# Patient Record
Sex: FEMALE | Race: WHITE
[De-identification: names, ages, dates, MRNs, and addresses within clinical notes are randomized per-mention and may not be internally consistent; named-entity substitution may affect disease eponyms.]

---

## 2019-03-27 ENCOUNTER — HOSPITAL ENCOUNTER (EMERGENCY)
Dept: HOSPITAL 12 - ER | Age: 64
Discharge: HOME | End: 2019-03-27
Payer: MEDICARE

## 2019-03-27 VITALS — WEIGHT: 205 LBS | BODY MASS INDEX: 37.73 KG/M2 | HEIGHT: 62 IN

## 2019-03-27 DIAGNOSIS — Z79.899: ICD-10-CM

## 2019-03-27 DIAGNOSIS — Z79.82: ICD-10-CM

## 2019-03-27 DIAGNOSIS — E78.5: ICD-10-CM

## 2019-03-27 DIAGNOSIS — G47.00: Primary | ICD-10-CM

## 2019-03-27 DIAGNOSIS — E03.9: ICD-10-CM

## 2019-03-27 DIAGNOSIS — K21.9: ICD-10-CM

## 2019-03-27 LAB
ALP SERPL-CCNC: 79 U/L (ref 50–136)
ALT SERPL W/O P-5'-P-CCNC: 23 U/L (ref 14–59)
AMPHETAMINES UR QL SCN>1000 NG/ML: NEGATIVE
APAP SERPL-MCNC: < 2 UG/ML (ref 10–30)
APPEARANCE UR: CLEAR
AST SERPL-CCNC: 14 U/L (ref 15–37)
BARBITURATES UR QL SCN: NEGATIVE
BASOPHILS # BLD AUTO: 0 K/UL (ref 0–8)
BASOPHILS NFR BLD AUTO: 0.4 % (ref 0–2)
BILIRUB DIRECT SERPL-MCNC: 0.1 MG/DL (ref 0–0.2)
BILIRUB SERPL-MCNC: 0.2 MG/DL (ref 0.2–1)
BILIRUB UR QL STRIP: NEGATIVE
BUN SERPL-MCNC: 35 MG/DL (ref 7–18)
CHLORIDE SERPL-SCNC: 100 MMOL/L (ref 98–107)
CO2 SERPL-SCNC: 28 MMOL/L (ref 21–32)
COCAINE UR QL SCN: NEGATIVE
COLOR UR: YELLOW
CREAT SERPL-MCNC: 0.8 MG/DL (ref 0.6–1.3)
DEPRECATED SQUAMOUS URNS QL MICRO: (no result) /HPF
EOSINOPHIL # BLD AUTO: 0 K/UL (ref 0–0.7)
EOSINOPHIL NFR BLD AUTO: 0 % (ref 0–7)
ETHANOL SERPL-MCNC: < 3 MG/DL (ref 0–0)
GLUCOSE SERPL-MCNC: 159 MG/DL (ref 74–106)
GLUCOSE UR STRIP-MCNC: NEGATIVE MG/DL
HCT VFR BLD AUTO: 36.8 % (ref 31.2–41.9)
HGB BLD-MCNC: 12.3 G/DL (ref 10.9–14.3)
HGB UR QL STRIP: (no result)
KETONES UR STRIP-MCNC: NEGATIVE MG/DL
LEUKOCYTE ESTERASE UR QL STRIP: (no result)
LYMPHOCYTES # BLD AUTO: 1.1 K/UL (ref 20–40)
LYMPHOCYTES NFR BLD AUTO: 16.2 % (ref 20.5–51.5)
MCH RBC QN AUTO: 29.2 UUG (ref 24.7–32.8)
MCHC RBC AUTO-ENTMCNC: 34 G/DL (ref 32.3–35.6)
MCV RBC AUTO: 87.3 FL (ref 75.5–95.3)
MONOCYTES # BLD AUTO: 0.1 K/UL (ref 2–10)
MONOCYTES NFR BLD AUTO: 2 % (ref 0–11)
NEUTROPHILS # BLD AUTO: 5.4 K/UL (ref 1.8–8.9)
NEUTROPHILS NFR BLD AUTO: 81.4 % (ref 38.5–71.5)
NITRITE UR QL STRIP: NEGATIVE
OPIATES UR QL SCN: POSITIVE
PCP UR QL SCN>25 NG/ML: NEGATIVE
PH UR STRIP: 6 [PH] (ref 5–8)
PLATELET # BLD AUTO: 190 K/UL (ref 179–408)
POTASSIUM SERPL-SCNC: 4.8 MMOL/L (ref 3.5–5.1)
RBC # BLD AUTO: 4.21 MIL/UL (ref 3.63–4.92)
RBC #/AREA URNS HPF: (no result) /HPF (ref 0–3)
SP GR UR STRIP: 1.02 (ref 1–1.03)
THC UR QL SCN>50 NG/ML: NEGATIVE
UROBILINOGEN UR STRIP-MCNC: 0.2 E.U./DL
WBC # BLD AUTO: 6.7 K/UL (ref 3.8–11.8)
WBC #/AREA URNS HPF: (no result) /HPF
WBC #/AREA URNS HPF: (no result) /HPF (ref 0–3)
WS STN SPEC: 7.5 G/DL (ref 6.4–8.2)

## 2019-03-27 PROCEDURE — 99284 EMERGENCY DEPT VISIT MOD MDM: CPT

## 2019-03-27 PROCEDURE — 36415 COLL VENOUS BLD VENIPUNCTURE: CPT

## 2019-03-27 PROCEDURE — 80048 BASIC METABOLIC PNL TOTAL CA: CPT

## 2019-03-27 PROCEDURE — A4663 DIALYSIS BLOOD PRESSURE CUFF: HCPCS

## 2019-03-27 PROCEDURE — 80076 HEPATIC FUNCTION PANEL: CPT

## 2019-03-27 PROCEDURE — 93005 ELECTROCARDIOGRAM TRACING: CPT

## 2019-03-27 PROCEDURE — 81001 URINALYSIS AUTO W/SCOPE: CPT

## 2019-03-27 PROCEDURE — G0480 DRUG TEST DEF 1-7 CLASSES: HCPCS

## 2019-03-27 PROCEDURE — 85025 COMPLETE CBC W/AUTO DIFF WBC: CPT

## 2019-03-27 PROCEDURE — 80307 DRUG TEST PRSMV CHEM ANLYZR: CPT

## 2019-03-27 NOTE — NUR
Patient discharged to home in stable conditon & steady gait.  Written and 
verbal after care instructions given to patient. Patient verbalizes 
understanding and compliance of instructions.

## 2020-01-24 ENCOUNTER — HOSPITAL ENCOUNTER (INPATIENT)
Dept: HOSPITAL 12 - ER | Age: 65
LOS: 3 days | Discharge: HOME | DRG: 305 | End: 2020-01-27
Payer: MEDICARE

## 2020-01-24 VITALS — HEIGHT: 62 IN | WEIGHT: 205 LBS | BODY MASS INDEX: 37.73 KG/M2

## 2020-01-24 VITALS — SYSTOLIC BLOOD PRESSURE: 164 MMHG | DIASTOLIC BLOOD PRESSURE: 73 MMHG

## 2020-01-24 DIAGNOSIS — N39.0: ICD-10-CM

## 2020-01-24 DIAGNOSIS — Z87.891: ICD-10-CM

## 2020-01-24 DIAGNOSIS — K43.9: ICD-10-CM

## 2020-01-24 DIAGNOSIS — I25.10: ICD-10-CM

## 2020-01-24 DIAGNOSIS — M25.562: ICD-10-CM

## 2020-01-24 DIAGNOSIS — E78.5: ICD-10-CM

## 2020-01-24 DIAGNOSIS — I10: ICD-10-CM

## 2020-01-24 DIAGNOSIS — E03.9: ICD-10-CM

## 2020-01-24 DIAGNOSIS — I16.9: Primary | ICD-10-CM

## 2020-01-24 DIAGNOSIS — G89.29: ICD-10-CM

## 2020-01-24 DIAGNOSIS — E66.9: ICD-10-CM

## 2020-01-24 DIAGNOSIS — M25.561: ICD-10-CM

## 2020-01-24 DIAGNOSIS — R60.0: ICD-10-CM

## 2020-01-24 DIAGNOSIS — Z90.49: ICD-10-CM

## 2020-01-24 DIAGNOSIS — Z80.41: ICD-10-CM

## 2020-01-24 DIAGNOSIS — Z98.84: ICD-10-CM

## 2020-01-24 DIAGNOSIS — M54.5: ICD-10-CM

## 2020-01-24 LAB
ALP SERPL-CCNC: 57 U/L (ref 50–136)
ALT SERPL W/O P-5'-P-CCNC: 28 U/L (ref 14–59)
AST SERPL-CCNC: 19 U/L (ref 15–37)
BASOPHILS # BLD AUTO: 0 K/UL (ref 0–8)
BASOPHILS NFR BLD AUTO: 0.6 % (ref 0–2)
BILIRUB DIRECT SERPL-MCNC: 0.1 MG/DL (ref 0–0.2)
BILIRUB SERPL-MCNC: 0.4 MG/DL (ref 0.2–1)
BUN SERPL-MCNC: 24 MG/DL (ref 7–18)
CHLORIDE SERPL-SCNC: 105 MMOL/L (ref 98–107)
CO2 SERPL-SCNC: 32 MMOL/L (ref 21–32)
CREAT SERPL-MCNC: 0.8 MG/DL (ref 0.6–1.3)
EOSINOPHIL # BLD AUTO: 0 K/UL (ref 0–0.7)
EOSINOPHIL NFR BLD AUTO: 0.2 % (ref 0–7)
GLUCOSE SERPL-MCNC: 94 MG/DL (ref 74–106)
HCT VFR BLD AUTO: 35.4 % (ref 31.2–41.9)
HGB BLD-MCNC: 11.8 G/DL (ref 10.9–14.3)
LYMPHOCYTES # BLD AUTO: 1.7 K/UL (ref 20–40)
LYMPHOCYTES NFR BLD AUTO: 21.9 % (ref 20.5–51.5)
MCH RBC QN AUTO: 29.8 UUG (ref 24.7–32.8)
MCHC RBC AUTO-ENTMCNC: 33 G/DL (ref 32.3–35.6)
MCV RBC AUTO: 89.2 FL (ref 75.5–95.3)
MONOCYTES # BLD AUTO: 0.4 K/UL (ref 2–10)
MONOCYTES NFR BLD AUTO: 4.8 % (ref 0–11)
NEUTROPHILS # BLD AUTO: 5.5 K/UL (ref 1.8–8.9)
NEUTROPHILS NFR BLD AUTO: 72.5 % (ref 38.5–71.5)
PLATELET # BLD AUTO: 192 K/UL (ref 179–408)
POTASSIUM SERPL-SCNC: 4.4 MMOL/L (ref 3.5–5.1)
RBC # BLD AUTO: 3.97 MIL/UL (ref 3.63–4.92)
WBC # BLD AUTO: 7.5 K/UL (ref 3.8–11.8)
WS STN SPEC: 7.2 G/DL (ref 6.4–8.2)

## 2020-01-24 PROCEDURE — G0378 HOSPITAL OBSERVATION PER HR: HCPCS

## 2020-01-24 PROCEDURE — A4663 DIALYSIS BLOOD PRESSURE CUFF: HCPCS

## 2020-01-24 RX ADMIN — ENOXAPARIN SODIUM SCH MG: 40 INJECTION SUBCUTANEOUS at 21:06

## 2020-01-24 RX ADMIN — ATORVASTATIN CALCIUM SCH MG: 10 TABLET, FILM COATED ORAL at 21:05

## 2020-01-24 NOTE — NUR
PATIENT BROUGHT FROM ED VIA GURNEY , ADMITTED TO TELEMETRY WITH DIAGNOSIS CHEST PAIN. 
PATIENT ALERT AND ORIENTED X 4. PATIENT SATED SHE STILL FEELS HEAVINESS IN THE CHEST AND 
MILD SOB. PLACED ON O2 @ 1LPM WITH O2 SAT 98%. B/P NOTED 164/73. HR 64. NSR ON THE MONITOR. 
WILL CONTINUE TO MONITOR.

## 2020-01-25 VITALS — DIASTOLIC BLOOD PRESSURE: 51 MMHG | SYSTOLIC BLOOD PRESSURE: 118 MMHG

## 2020-01-25 VITALS — DIASTOLIC BLOOD PRESSURE: 64 MMHG | SYSTOLIC BLOOD PRESSURE: 145 MMHG

## 2020-01-25 VITALS — DIASTOLIC BLOOD PRESSURE: 66 MMHG | SYSTOLIC BLOOD PRESSURE: 143 MMHG

## 2020-01-25 VITALS — DIASTOLIC BLOOD PRESSURE: 47 MMHG | SYSTOLIC BLOOD PRESSURE: 122 MMHG

## 2020-01-25 VITALS — DIASTOLIC BLOOD PRESSURE: 57 MMHG | SYSTOLIC BLOOD PRESSURE: 92 MMHG

## 2020-01-25 LAB
BASOPHILS # BLD AUTO: 0 K/UL (ref 0–8)
BASOPHILS NFR BLD AUTO: 0.5 % (ref 0–2)
BUN SERPL-MCNC: 26 MG/DL (ref 7–18)
CHLORIDE SERPL-SCNC: 104 MMOL/L (ref 98–107)
CHOLEST SERPL-MCNC: 157 MG/DL (ref ?–200)
CO2 SERPL-SCNC: 27 MMOL/L (ref 21–32)
CREAT SERPL-MCNC: 1 MG/DL (ref 0.6–1.3)
EOSINOPHIL # BLD AUTO: 0 K/UL (ref 0–0.7)
EOSINOPHIL NFR BLD AUTO: 0.3 % (ref 0–7)
GLUCOSE SERPL-MCNC: 106 MG/DL (ref 74–106)
HCT VFR BLD AUTO: 32.4 % (ref 31.2–41.9)
HDLC SERPL-MCNC: 85 MG/DL (ref 40–60)
HGB BLD-MCNC: 11.1 G/DL (ref 10.9–14.3)
LYMPHOCYTES # BLD AUTO: 1.6 K/UL (ref 20–40)
LYMPHOCYTES NFR BLD AUTO: 25.9 % (ref 20.5–51.5)
MAGNESIUM SERPL-MCNC: 2 MG/DL (ref 1.8–2.4)
MCH RBC QN AUTO: 30.5 UUG (ref 24.7–32.8)
MCHC RBC AUTO-ENTMCNC: 34 G/DL (ref 32.3–35.6)
MCV RBC AUTO: 88.5 FL (ref 75.5–95.3)
MONOCYTES # BLD AUTO: 0.4 K/UL (ref 2–10)
MONOCYTES NFR BLD AUTO: 6 % (ref 0–11)
NEUTROPHILS # BLD AUTO: 4.3 K/UL (ref 1.8–8.9)
NEUTROPHILS NFR BLD AUTO: 67.3 % (ref 38.5–71.5)
PHOSPHATE SERPL-MCNC: 4.6 MG/DL (ref 2.5–4.9)
PLATELET # BLD AUTO: 157 K/UL (ref 179–408)
POTASSIUM SERPL-SCNC: 4.5 MMOL/L (ref 3.5–5.1)
RBC # BLD AUTO: 3.66 MIL/UL (ref 3.63–4.92)
TRIGL SERPL-MCNC: 88 MG/DL (ref 30–150)
TSH SERPL DL<=0.005 MIU/L-ACNC: 0.23 MIU/ML (ref 0.36–3.74)
WBC # BLD AUTO: 6.3 K/UL (ref 3.8–11.8)

## 2020-01-25 RX ADMIN — ASPIRIN SCH MG: 81 TABLET, COATED ORAL at 08:39

## 2020-01-25 RX ADMIN — Medication PRN MG: at 23:20

## 2020-01-25 RX ADMIN — Medication SCH MG: at 08:42

## 2020-01-25 RX ADMIN — ATORVASTATIN CALCIUM SCH MG: 10 TABLET, FILM COATED ORAL at 21:03

## 2020-01-25 RX ADMIN — ESCITALOPRAM OXALATE SCH MG: 10 TABLET, FILM COATED ORAL at 08:40

## 2020-01-25 RX ADMIN — Medication PRN MG: at 00:42

## 2020-01-25 RX ADMIN — Medication PRN MG: at 08:48

## 2020-01-25 RX ADMIN — LEVOTHYROXINE SODIUM SCH MCG: 112 TABLET ORAL at 06:05

## 2020-01-25 RX ADMIN — Medication SCH MG: at 16:27

## 2020-01-25 RX ADMIN — Medication PRN MG: at 16:38

## 2020-01-25 RX ADMIN — PANTOPRAZOLE SODIUM SCH MG: 40 TABLET, DELAYED RELEASE ORAL at 06:05

## 2020-01-25 RX ADMIN — ENOXAPARIN SODIUM SCH MG: 40 INJECTION SUBCUTANEOUS at 21:04

## 2020-01-25 NOTE — NUR
PATIENT SLEPT FOR 2-3 HRS. PAIN MEDICATION ADMINISTERED ON C/O BILATERAL KNEE PAIN. NO CHEST 
PAIN NOTED. SR ON THE MONITOR.

## 2020-01-26 ENCOUNTER — HOSPITAL ENCOUNTER (OUTPATIENT)
Dept: HOSPITAL 54 - RAD | Age: 65
Discharge: HOME | End: 2020-01-26
Attending: INTERNAL MEDICINE
Payer: MEDICARE

## 2020-01-26 VITALS — SYSTOLIC BLOOD PRESSURE: 139 MMHG | DIASTOLIC BLOOD PRESSURE: 60 MMHG

## 2020-01-26 VITALS — SYSTOLIC BLOOD PRESSURE: 160 MMHG | DIASTOLIC BLOOD PRESSURE: 55 MMHG

## 2020-01-26 VITALS — HEIGHT: 62 IN | BODY MASS INDEX: 38.09 KG/M2 | WEIGHT: 207 LBS

## 2020-01-26 VITALS — DIASTOLIC BLOOD PRESSURE: 54 MMHG | SYSTOLIC BLOOD PRESSURE: 147 MMHG

## 2020-01-26 VITALS — SYSTOLIC BLOOD PRESSURE: 127 MMHG | DIASTOLIC BLOOD PRESSURE: 55 MMHG

## 2020-01-26 VITALS — SYSTOLIC BLOOD PRESSURE: 132 MMHG | DIASTOLIC BLOOD PRESSURE: 64 MMHG

## 2020-01-26 VITALS — DIASTOLIC BLOOD PRESSURE: 51 MMHG | SYSTOLIC BLOOD PRESSURE: 130 MMHG

## 2020-01-26 VITALS — DIASTOLIC BLOOD PRESSURE: 53 MMHG | SYSTOLIC BLOOD PRESSURE: 133 MMHG

## 2020-01-26 DIAGNOSIS — E78.5: ICD-10-CM

## 2020-01-26 DIAGNOSIS — I25.10: ICD-10-CM

## 2020-01-26 DIAGNOSIS — I10: Primary | ICD-10-CM

## 2020-01-26 DIAGNOSIS — E03.9: ICD-10-CM

## 2020-01-26 DIAGNOSIS — I25.83: ICD-10-CM

## 2020-01-26 DIAGNOSIS — R60.0: ICD-10-CM

## 2020-01-26 LAB
AMPHETAMINES UR QL SCN>1000 NG/ML: NEGATIVE
APPEARANCE UR: CLEAR
BARBITURATES UR QL SCN: NEGATIVE
BASOPHILS # BLD AUTO: 0 K/UL (ref 0–8)
BASOPHILS NFR BLD AUTO: 0.5 % (ref 0–2)
BILIRUB UR QL STRIP: NEGATIVE
BUN SERPL-MCNC: 26 MG/DL (ref 7–18)
CHLORIDE SERPL-SCNC: 104 MMOL/L (ref 98–107)
CO2 SERPL-SCNC: 32 MMOL/L (ref 21–32)
COCAINE UR QL SCN: NEGATIVE
COLOR UR: YELLOW
CREAT SERPL-MCNC: 1 MG/DL (ref 0.6–1.3)
DEPRECATED SQUAMOUS URNS QL MICRO: (no result) /HPF
EOSINOPHIL # BLD AUTO: 0 K/UL (ref 0–0.7)
EOSINOPHIL NFR BLD AUTO: 0.7 % (ref 0–7)
GLUCOSE SERPL-MCNC: 123 MG/DL (ref 74–106)
GLUCOSE UR STRIP-MCNC: NEGATIVE MG/DL
HCT VFR BLD AUTO: 33.3 % (ref 31.2–41.9)
HGB BLD-MCNC: 11.4 G/DL (ref 10.9–14.3)
HGB UR QL STRIP: (no result)
KETONES UR STRIP-MCNC: NEGATIVE MG/DL
LEUKOCYTE ESTERASE UR QL STRIP: (no result)
LYMPHOCYTES # BLD AUTO: 1.8 K/UL (ref 20–40)
LYMPHOCYTES NFR BLD AUTO: 24.4 % (ref 20.5–51.5)
MCH RBC QN AUTO: 30.4 UUG (ref 24.7–32.8)
MCHC RBC AUTO-ENTMCNC: 34 G/DL (ref 32.3–35.6)
MCV RBC AUTO: 88.8 FL (ref 75.5–95.3)
MONOCYTES # BLD AUTO: 0.4 K/UL (ref 2–10)
MONOCYTES NFR BLD AUTO: 5.6 % (ref 0–11)
MUCOUS THREADS URNS QL MICRO: (no result) /LPF
NEUTROPHILS # BLD AUTO: 5 K/UL (ref 1.8–8.9)
NEUTROPHILS NFR BLD AUTO: 68.8 % (ref 38.5–71.5)
NITRITE UR QL STRIP: NEGATIVE
OPIATES UR QL SCN: POSITIVE
PCP UR QL SCN>25 NG/ML: NEGATIVE
PH UR STRIP: 6 [PH] (ref 5–8)
PLATELET # BLD AUTO: 190 K/UL (ref 179–408)
POTASSIUM SERPL-SCNC: 4.4 MMOL/L (ref 3.5–5.1)
RBC # BLD AUTO: 3.75 MIL/UL (ref 3.63–4.92)
RBC #/AREA URNS HPF: (no result) /HPF (ref 0–3)
SP GR UR STRIP: 1.02 (ref 1–1.03)
THC UR QL SCN>50 NG/ML: NEGATIVE
UROBILINOGEN UR STRIP-MCNC: 0.2 E.U./DL
WBC # BLD AUTO: 7.3 K/UL (ref 3.8–11.8)
WBC #/AREA URNS HPF: (no result) /HPF
WBC #/AREA URNS HPF: (no result) /HPF (ref 0–3)

## 2020-01-26 PROCEDURE — B2211ZZ COMPUTERIZED TOMOGRAPHY (CT SCAN) OF MULTIPLE CORONARY ARTERIES USING LOW OSMOLAR CONTRAST: ICD-10-PCS

## 2020-01-26 PROCEDURE — 75574 CT ANGIO HRT W/3D IMAGE: CPT

## 2020-01-26 RX ADMIN — PANTOPRAZOLE SODIUM SCH MG: 40 TABLET, DELAYED RELEASE ORAL at 06:46

## 2020-01-26 RX ADMIN — LEVOTHYROXINE SODIUM SCH MCG: 112 TABLET ORAL at 06:46

## 2020-01-26 RX ADMIN — Medication PRN MG: at 23:24

## 2020-01-26 RX ADMIN — ATORVASTATIN CALCIUM SCH MG: 10 TABLET, FILM COATED ORAL at 20:15

## 2020-01-26 RX ADMIN — ASPIRIN SCH MG: 81 TABLET, COATED ORAL at 08:25

## 2020-01-26 RX ADMIN — ENOXAPARIN SODIUM SCH MG: 40 INJECTION SUBCUTANEOUS at 20:16

## 2020-01-26 RX ADMIN — ESCITALOPRAM OXALATE SCH MG: 10 TABLET, FILM COATED ORAL at 08:25

## 2020-01-26 RX ADMIN — Medication PRN MG: at 22:46

## 2020-01-26 RX ADMIN — Medication SCH MG: at 08:26

## 2020-01-26 RX ADMIN — Medication SCH MG: at 16:53

## 2020-01-26 NOTE — NUR
AWAKE ALERT AND ORIENTED X3 DENIES ACUTE PAIN OR DISTRESS. AWAITING SCHEDULE FOR CTA HEART 
AT [facility]. SB ON MONITOR 54-56/MIN

## 2020-01-26 NOTE — NUR
NEW IV STARTED ON RAC 18 G STARTED . WAITING FOR CTA OF HEART . NO C/O PAIN OR DISCOMFORT. 
NSR ON THE MONITOR

## 2020-01-26 NOTE — NUR
Patient transported back to [facility] via [facility]. Report given to paramedic. Patient 
left in stable condition and denies any discomfort. Patient understands the verbal 
instructions.

## 2020-01-26 NOTE — NUR
RECEIVED PT AWAKE, ALERT AND ORIENTEDX4. PT IN NO ACUTE DISTRESS. IV INTACT.PT ON NASAL 
CANNULA. FAMILY AT BEDSIDE. SAFETY AND COMFORT PROVIDED.WILL CONTINUE TO MONITOR.

## 2020-01-27 VITALS — SYSTOLIC BLOOD PRESSURE: 114 MMHG | DIASTOLIC BLOOD PRESSURE: 56 MMHG

## 2020-01-27 VITALS — SYSTOLIC BLOOD PRESSURE: 139 MMHG | DIASTOLIC BLOOD PRESSURE: 60 MMHG

## 2020-01-27 VITALS — SYSTOLIC BLOOD PRESSURE: 123 MMHG | DIASTOLIC BLOOD PRESSURE: 56 MMHG

## 2020-01-27 LAB
BASOPHILS # BLD AUTO: 0.1 K/UL (ref 0–8)
BASOPHILS NFR BLD AUTO: 1 % (ref 0–2)
BUN SERPL-MCNC: 24 MG/DL (ref 7–18)
CHLORIDE SERPL-SCNC: 104 MMOL/L (ref 98–107)
CO2 SERPL-SCNC: 29 MMOL/L (ref 21–32)
CREAT SERPL-MCNC: 1 MG/DL (ref 0.6–1.3)
EOSINOPHIL # BLD AUTO: 0.1 K/UL (ref 0–0.7)
EOSINOPHIL NFR BLD AUTO: 0.7 % (ref 0–7)
GLUCOSE SERPL-MCNC: 148 MG/DL (ref 74–106)
HCT VFR BLD AUTO: 37.5 % (ref 31.2–41.9)
HGB BLD-MCNC: 12.6 G/DL (ref 10.9–14.3)
LYMPHOCYTES # BLD AUTO: 2 K/UL (ref 20–40)
LYMPHOCYTES NFR BLD AUTO: 20.3 % (ref 20.5–51.5)
MAGNESIUM SERPL-MCNC: 2 MG/DL (ref 1.8–2.4)
MCH RBC QN AUTO: 30.1 UUG (ref 24.7–32.8)
MCHC RBC AUTO-ENTMCNC: 33 G/DL (ref 32.3–35.6)
MCV RBC AUTO: 90.2 FL (ref 75.5–95.3)
MONOCYTES # BLD AUTO: 0.6 K/UL (ref 2–10)
MONOCYTES NFR BLD AUTO: 5.7 % (ref 0–11)
NEUTROPHILS # BLD AUTO: 7.1 K/UL (ref 1.8–8.9)
NEUTROPHILS NFR BLD AUTO: 72.3 % (ref 38.5–71.5)
PHOSPHATE SERPL-MCNC: 4.9 MG/DL (ref 2.5–4.9)
PLATELET # BLD AUTO: 186 K/UL (ref 179–408)
POTASSIUM SERPL-SCNC: 4.2 MMOL/L (ref 3.5–5.1)
RBC # BLD AUTO: 4.16 MIL/UL (ref 3.63–4.92)
WBC # BLD AUTO: 9.9 K/UL (ref 3.8–11.8)

## 2020-01-27 RX ADMIN — Medication SCH MG: at 08:37

## 2020-01-27 RX ADMIN — Medication SCH MG: at 08:36

## 2020-01-27 RX ADMIN — LEVOTHYROXINE SODIUM SCH MCG: 112 TABLET ORAL at 06:12

## 2020-01-27 RX ADMIN — ESCITALOPRAM OXALATE SCH MG: 10 TABLET, FILM COATED ORAL at 08:36

## 2020-01-27 RX ADMIN — PANTOPRAZOLE SODIUM SCH MG: 40 TABLET, DELAYED RELEASE ORAL at 06:12

## 2020-01-27 RX ADMIN — ASPIRIN SCH MG: 81 TABLET, COATED ORAL at 08:36

## 2020-01-27 NOTE — NUR
PATIENT DISCHARGED HOME SELF CARE WITH DISCHARGE INSTRUCTIONS AND ALL OS HER PERSONAL 
BELONGINGS AND HER OWN HOME MEDICATIONS FROM [facility] AND PATIENT INSTRUCTED THAT HER 
ANTIBIOTICS WAS SENT ELECTRONICALLY TO HER PRIVATE PHARMACY AND TO CALL FOR A FOLLOW UP 
APPOINTMENT WITH HER PRIMARY DOCTOR WITHIN THE NEXT ONE WEEK AND SHE EXPRESSED 
UNDERSTANDING.

## 2020-01-27 NOTE — NUR
PT SLEPT INTERMITTENTLY. PT IN NO ACUTE DISTRESS. PRESCRIBED MEDICATION GIVEN AND PT 
TOLERATED IT WELL. MORPHINE GIVEN AT 2246H FOR GENERALIZED PAIN. PT GIVEN MOM AS PER PT 
REQUEST.  PT TOLERATED IT WELL.  SAFETY AND COMFORT PROVIDED. WILL ENDORSED TO INCOMING 
NURSE FOR CONTINUITY OF CARE.

## 2020-01-27 NOTE — NUR
PATIENT SEEN AND EXAMINED BY PASCHUAL DNP WITH ORDER TO DISCHARGE PATIENT HOME TODAY PATIENT 
AWARE AND STATED WILL BE ABLE TO LEAVE IN ABOUT 2 HOURS.

## 2025-04-01 ENCOUNTER — HOSPITAL ENCOUNTER (EMERGENCY)
Dept: HOSPITAL 12 - ER | Age: 70
LOS: 1 days | Discharge: HOME | End: 2025-04-02
Payer: MEDICARE

## 2025-04-01 VITALS — TEMPERATURE: 97.5 F

## 2025-04-01 VITALS — BODY MASS INDEX: 30.65 KG/M2 | HEIGHT: 63 IN | WEIGHT: 173 LBS

## 2025-04-01 DIAGNOSIS — Z96.659: ICD-10-CM

## 2025-04-01 DIAGNOSIS — R45.1: ICD-10-CM

## 2025-04-01 DIAGNOSIS — Z79.82: ICD-10-CM

## 2025-04-01 DIAGNOSIS — Z79.899: ICD-10-CM
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
## 2025-04-01 DIAGNOSIS — M25.552: ICD-10-CM

## 2025-04-01 DIAGNOSIS — M25.551: ICD-10-CM

## 2025-04-01 DIAGNOSIS — F32.A: ICD-10-CM

## 2025-04-01 DIAGNOSIS — F17.200: ICD-10-CM
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
## 2025-04-01 DIAGNOSIS — M54.59: Primary | ICD-10-CM

## 2025-04-01 DIAGNOSIS — E78.5: ICD-10-CM
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
## 2025-04-01 PROCEDURE — A4606 OXYGEN PROBE USED W OXIMETER: HCPCS

## 2025-04-01 PROCEDURE — A4663 DIALYSIS BLOOD PRESSURE CUFF: HCPCS

## 2025-04-01 RX ADMIN — HYDROCODONE BITARTRATE AND ACETAMINOPHEN ONE TAB: 5; 325 TABLET ORAL at 23:56

## 2025-04-01 RX ADMIN — NAPROXEN ONE MG: 500 TABLET ORAL at 23:56

## 2025-04-01 RX ADMIN — TETANUS TOXOID, REDUCED DIPHTHERIA TOXOID AND ACELLULAR PERTUSSIS VACCINE, ADSORBED ONE ML: 5; 2.5; 8; 8; 2.5 SUSPENSION INTRAMUSCULAR at 23:56

## 2025-04-01 RX ADMIN — BACITRACIN ZINC, NEOMYCIN, POLYMYXIN B ONE PKT: 400; 3.5; 5 OINTMENT TOPICAL at 23:56

## 2025-04-02 VITALS — SYSTOLIC BLOOD PRESSURE: 110 MMHG | DIASTOLIC BLOOD PRESSURE: 57 MMHG | OXYGEN SATURATION: 98 %
